# Patient Record
Sex: FEMALE | URBAN - METROPOLITAN AREA
[De-identification: names, ages, dates, MRNs, and addresses within clinical notes are randomized per-mention and may not be internally consistent; named-entity substitution may affect disease eponyms.]

---

## 2021-11-22 ENCOUNTER — OFFICE VISIT (OUTPATIENT)
Dept: URBAN - METROPOLITAN AREA CLINIC 28 | Facility: CLINIC | Age: 41
End: 2021-11-22
Payer: COMMERCIAL

## 2021-11-22 DIAGNOSIS — H18.821 CORNEAL DISORDER DUE TO CONTACT LENS, RIGHT EYE: Primary | ICD-10-CM

## 2021-11-22 PROCEDURE — 99202 OFFICE O/P NEW SF 15 MIN: CPT | Performed by: OPTOMETRIST

## 2021-11-22 RX ORDER — NEOMYCIN SULFATE, POLYMYXIN B SULFATE AND DEXAMETHASONE 3.5; 10000; 1 MG/ML; [USP'U]/ML; MG/ML
SUSPENSION OPHTHALMIC
Qty: 10 | Refills: 0 | Status: INACTIVE
Start: 2021-11-22 | End: 2021-11-29

## 2021-11-22 ASSESSMENT — INTRAOCULAR PRESSURE
OD: 14
OS: 12

## 2021-11-22 NOTE — IMPRESSION/PLAN
Impression: Corneal disorder due to contact lens, right eye: M20.031. Plan: Educated on condition and association with CL overwear. D/c CL wear and start maxitrol Q2H today and tomorrow, then decrease to QID OD x 1 week. Monitor with any increase in pain, redness, or irritation.